# Patient Record
Sex: FEMALE | Race: WHITE
[De-identification: names, ages, dates, MRNs, and addresses within clinical notes are randomized per-mention and may not be internally consistent; named-entity substitution may affect disease eponyms.]

---

## 2017-06-21 ENCOUNTER — HOSPITAL ENCOUNTER (OUTPATIENT)
Dept: HOSPITAL 61 - MAMMO | Age: 32
Discharge: HOME | End: 2017-06-21
Attending: NURSE PRACTITIONER
Payer: COMMERCIAL

## 2017-06-21 DIAGNOSIS — R92.8: Primary | ICD-10-CM

## 2017-06-21 DIAGNOSIS — N64.52: ICD-10-CM

## 2017-06-21 PROCEDURE — 76641 ULTRASOUND BREAST COMPLETE: CPT

## 2017-06-21 NOTE — RAD
DATE: 6/21/2017



EXAM: DIGITAL DIAGNOSTIC BILATERAL, BREAST LEFT



HISTORY: Bilateral yellowish/greenish nipple discharge left greater than right



COMPARISON: None



This study was interpreted with the benefit of Computerized Aided Detection

(CAD).



FINDINGS:



Breast Density: SCATTERED  The breast parenchyma shows scattered

fibroglandular densities. Breast parenchyma level B. 



 Best demonstrated on the cc views are nodular opacities centrally in the

breasts. Several additional images were obtained a dominant mass is not seen

and the nodular opacities on the conventional views likely reflect glandular

elements. The nodular opacity in the left breast seen to persist more so than

the right and targeted ultrasound was performed but no mass was seen on the

targeted ultrasound.







IMPRESSION: Probable benign findings. Follow-up bilateral mammography is

suggested in 6 months to document stability of the nodular opacities seen on

this initial screening exam

                         Consider pituitary or systemic causes for bilateral

nipple discharge.









BI-RADS CATEGORY: 3 PROBABLE BENIGN FINDING(S-SHORT INTERVAL FOLLOW-UP

SUGGESTED



RECOMMENDED FOLLOW-UP: 6M 6 MONTH FOLLOW-UP



PQRS compliance statement: Patient information was entered into a reminder

system with a target due date 12/21/2017 for the next mammogram.



Mammography is a sensitive method for finding small breast cancers, but it

does not detect them all and is not a substitute for careful clinical

examination.  A negative mammogram does not negate a clinically suspicious

finding and should not result in delay in biopsying a clinically suspicious

abnormality.



"Our facility is accredited by the [employer] Mammography

Program."

## 2017-09-27 ENCOUNTER — HOSPITAL ENCOUNTER (OUTPATIENT)
Dept: HOSPITAL 61 - KCIC | Age: 32
Discharge: HOME | End: 2017-09-27
Attending: NURSE PRACTITIONER
Payer: COMMERCIAL

## 2017-09-27 DIAGNOSIS — R53.1: ICD-10-CM

## 2017-09-27 DIAGNOSIS — M25.531: Primary | ICD-10-CM

## 2017-09-27 PROCEDURE — 73110 X-RAY EXAM OF WRIST: CPT

## 2017-09-27 NOTE — [facility]
Three-view right wrist

 

HISTORY: Anterior wrist pain for 4 days. Weakness and swelling.

 

FINDINGS:

No evidence of acute fracture. No bone destruction. Alignment and soft 

tissues appear within normal limits.

 

IMPRESSION: No acute fracture or dislocation.

 

Electronically signed by: Kentrell Hernandez MD (9/27/2017 4:08 PM) [facility]

## 2018-06-22 ENCOUNTER — HOSPITAL ENCOUNTER (OUTPATIENT)
Dept: HOSPITAL 61 - MAMMO | Age: 33
Discharge: HOME | End: 2018-06-22
Attending: NURSE PRACTITIONER
Payer: COMMERCIAL

## 2018-06-22 DIAGNOSIS — R92.8: Primary | ICD-10-CM

## 2018-06-22 PROCEDURE — 77066 DX MAMMO INCL CAD BI: CPT

## 2018-09-06 ENCOUNTER — HOSPITAL ENCOUNTER (OUTPATIENT)
Dept: HOSPITAL 61 - US | Age: 33
Discharge: HOME | End: 2018-09-06
Attending: NURSE PRACTITIONER
Payer: COMMERCIAL

## 2018-09-06 DIAGNOSIS — N88.8: Primary | ICD-10-CM

## 2018-09-06 PROCEDURE — 76830 TRANSVAGINAL US NON-OB: CPT

## 2018-09-06 PROCEDURE — 76856 US EXAM PELVIC COMPLETE: CPT

## 2018-11-07 ENCOUNTER — HOSPITAL ENCOUNTER (OUTPATIENT)
Dept: HOSPITAL 61 - SURGPAT | Age: 33
Discharge: HOME | End: 2018-11-07
Attending: OBSTETRICS & GYNECOLOGY
Payer: COMMERCIAL

## 2018-11-07 DIAGNOSIS — Z88.0: ICD-10-CM

## 2018-11-07 DIAGNOSIS — Z01.818: Primary | ICD-10-CM

## 2018-11-07 LAB
ALBUMIN SERPL-MCNC: 3.5 G/DL (ref 3.4–5)
ALBUMIN/GLOB SERPL: 0.9 {RATIO} (ref 1–1.7)
ALP SERPL-CCNC: 84 U/L (ref 46–116)
ALT SERPL-CCNC: 28 U/L (ref 14–59)
ANION GAP SERPL CALC-SCNC: 13 MMOL/L (ref 6–14)
APTT PPP: YELLOW S
AST SERPL-CCNC: 15 U/L (ref 15–37)
BACTERIA #/AREA URNS HPF: (no result) /HPF
BASOPHILS # BLD AUTO: 0 X10^3/UL (ref 0–0.2)
BASOPHILS NFR BLD: 1 % (ref 0–3)
BILIRUB SERPL-MCNC: 0.2 MG/DL (ref 0.2–1)
BILIRUB UR QL STRIP: NEGATIVE
BUN SERPL-MCNC: 8 MG/DL (ref 7–20)
BUN/CREAT SERPL: 10 (ref 6–20)
CALCIUM SERPL-MCNC: 8.7 MG/DL (ref 8.5–10.1)
CHLORIDE SERPL-SCNC: 104 MMOL/L (ref 98–107)
CO2 SERPL-SCNC: 22 MMOL/L (ref 21–32)
CREAT SERPL-MCNC: 0.8 MG/DL (ref 0.6–1)
EOSINOPHIL NFR BLD: 0.1 X10^3/UL (ref 0–0.7)
EOSINOPHIL NFR BLD: 1 % (ref 0–3)
ERYTHROCYTE [DISTWIDTH] IN BLOOD BY AUTOMATED COUNT: 13.9 % (ref 11.5–14.5)
FIBRINOGEN PPP-MCNC: CLEAR MG/DL
GFR SERPLBLD BASED ON 1.73 SQ M-ARVRAT: 82.6 ML/MIN
GLOBULIN SER-MCNC: 3.8 G/DL (ref 2.2–3.8)
GLUCOSE SERPL-MCNC: 128 MG/DL (ref 70–99)
HCT VFR BLD CALC: 42.3 % (ref 36–47)
HGB BLD-MCNC: 14.4 G/DL (ref 12–15.5)
LYMPHOCYTES # BLD: 2.5 X10^3/UL (ref 1–4.8)
LYMPHOCYTES NFR BLD AUTO: 25 % (ref 24–48)
MCH RBC QN AUTO: 30 PG (ref 25–35)
MCHC RBC AUTO-ENTMCNC: 34 G/DL (ref 31–37)
MCV RBC AUTO: 87 FL (ref 79–100)
MONO #: 0.5 X10^3/UL (ref 0–1.1)
MONOCYTES NFR BLD: 5 % (ref 0–9)
NEUT #: 7 X10^3UL (ref 1.8–7.7)
NEUTROPHILS NFR BLD AUTO: 69 % (ref 31–73)
NITRITE UR QL STRIP: NEGATIVE
PH UR STRIP: 5.5 [PH]
PLATELET # BLD AUTO: 286 X10^3/UL (ref 140–400)
POTASSIUM SERPL-SCNC: 3.6 MMOL/L (ref 3.5–5.1)
PROT SERPL-MCNC: 7.3 G/DL (ref 6.4–8.2)
PROT UR STRIP-MCNC: NEGATIVE MG/DL
RBC # BLD AUTO: 4.85 X10^6/UL (ref 3.5–5.4)
RBC #/AREA URNS HPF: 0 /HPF (ref 0–2)
SODIUM SERPL-SCNC: 139 MMOL/L (ref 136–145)
SQUAMOUS #/AREA URNS LPF: (no result) /LPF
UROBILINOGEN UR-MCNC: 0.2 MG/DL
WBC # BLD AUTO: 10.2 X10^3/UL (ref 4–11)
WBC #/AREA URNS HPF: (no result) /HPF (ref 0–4)

## 2018-11-07 PROCEDURE — 36415 COLL VENOUS BLD VENIPUNCTURE: CPT

## 2018-11-07 PROCEDURE — 81001 URINALYSIS AUTO W/SCOPE: CPT

## 2018-11-07 PROCEDURE — 80053 COMPREHEN METABOLIC PANEL: CPT

## 2018-11-07 PROCEDURE — 85025 COMPLETE CBC W/AUTO DIFF WBC: CPT

## 2018-11-14 ENCOUNTER — HOSPITAL ENCOUNTER (OUTPATIENT)
Dept: HOSPITAL 61 - SURG | Age: 33
Setting detail: OBSERVATION
Discharge: HOME | End: 2018-11-14
Attending: OBSTETRICS & GYNECOLOGY | Admitting: OBSTETRICS & GYNECOLOGY
Payer: COMMERCIAL

## 2018-11-14 VITALS — DIASTOLIC BLOOD PRESSURE: 78 MMHG | SYSTOLIC BLOOD PRESSURE: 112 MMHG

## 2018-11-14 VITALS — DIASTOLIC BLOOD PRESSURE: 63 MMHG | SYSTOLIC BLOOD PRESSURE: 105 MMHG

## 2018-11-14 VITALS — DIASTOLIC BLOOD PRESSURE: 68 MMHG | SYSTOLIC BLOOD PRESSURE: 102 MMHG

## 2018-11-14 VITALS — SYSTOLIC BLOOD PRESSURE: 87 MMHG | DIASTOLIC BLOOD PRESSURE: 59 MMHG

## 2018-11-14 VITALS — WEIGHT: 194 LBS | BODY MASS INDEX: 33.12 KG/M2 | HEIGHT: 64 IN

## 2018-11-14 VITALS — DIASTOLIC BLOOD PRESSURE: 62 MMHG | SYSTOLIC BLOOD PRESSURE: 102 MMHG

## 2018-11-14 VITALS — SYSTOLIC BLOOD PRESSURE: 108 MMHG | DIASTOLIC BLOOD PRESSURE: 70 MMHG

## 2018-11-14 VITALS — SYSTOLIC BLOOD PRESSURE: 110 MMHG | DIASTOLIC BLOOD PRESSURE: 74 MMHG

## 2018-11-14 VITALS — SYSTOLIC BLOOD PRESSURE: 99 MMHG | DIASTOLIC BLOOD PRESSURE: 52 MMHG

## 2018-11-14 DIAGNOSIS — N32.89: ICD-10-CM

## 2018-11-14 DIAGNOSIS — N94.6: ICD-10-CM

## 2018-11-14 DIAGNOSIS — N83.8: ICD-10-CM

## 2018-11-14 DIAGNOSIS — D25.1: Primary | ICD-10-CM

## 2018-11-14 DIAGNOSIS — N92.0: ICD-10-CM

## 2018-11-14 DIAGNOSIS — N80.0: ICD-10-CM

## 2018-11-14 DIAGNOSIS — N88.8: ICD-10-CM

## 2018-11-14 DIAGNOSIS — E28.2: ICD-10-CM

## 2018-11-14 DIAGNOSIS — D25.2: ICD-10-CM

## 2018-11-14 DIAGNOSIS — K66.0: ICD-10-CM

## 2018-11-14 LAB — U PREG PATIENT: NEGATIVE

## 2018-11-14 PROCEDURE — 86850 RBC ANTIBODY SCREEN: CPT

## 2018-11-14 PROCEDURE — 85018 HEMOGLOBIN: CPT

## 2018-11-14 PROCEDURE — 81025 URINE PREGNANCY TEST: CPT

## 2018-11-14 PROCEDURE — 88307 TISSUE EXAM BY PATHOLOGIST: CPT

## 2018-11-14 PROCEDURE — 86900 BLOOD TYPING SEROLOGIC ABO: CPT

## 2018-11-14 PROCEDURE — 58552 LAPARO-VAG HYST INCL T/O: CPT

## 2018-11-14 PROCEDURE — A7015 AEROSOL MASK USED W NEBULIZE: HCPCS

## 2018-11-14 PROCEDURE — 86901 BLOOD TYPING SEROLOGIC RH(D): CPT

## 2018-11-14 PROCEDURE — G0378 HOSPITAL OBSERVATION PER HR: HCPCS

## 2018-11-14 PROCEDURE — 36415 COLL VENOUS BLD VENIPUNCTURE: CPT

## 2018-11-14 PROCEDURE — G0379 DIRECT REFER HOSPITAL OBSERV: HCPCS

## 2018-11-14 RX ADMIN — FENTANYL CITRATE PRN MCG: 50 INJECTION INTRAMUSCULAR; INTRAVENOUS at 12:58

## 2018-11-14 RX ADMIN — FENTANYL CITRATE PRN MCG: 50 INJECTION INTRAMUSCULAR; INTRAVENOUS at 12:45

## 2018-11-14 NOTE — PDOC
BRIEF OPERATIVE NOTE


Date:  Nov 14, 2018


Pre-Op Diagnosis


dysmenorrhea, menorrhagia, uterine fibroids


Post-Op Diagnosis


same plus mild adhesions


Procedure Performed


LAVH/BSO/adhesiolysis


Surgeon


Dr. Lisa Cortez


Assistant


Carla Dunbar,  Assist


Anesthesiologist


Dr. Stewart


Anesthesia Type:  General


Blood Loss


50cc


IV Fluid


1500cc


Urine Output


80cc clear and concentrated via colón


Specimens Obtained


cervix, uterus, bilateral tubes and ovaries


Findings


enlarged fibroid uterus, mildly enlarged PCOS ovaries, some bowel adhesions


Complications


none


Operative Note


[id number]











LISA CORTEZ MD Nov 14, 2018 12:09

## 2018-11-14 NOTE — OP
DATE OF SURGERY:  11/14/2018



PREOPERATIVE DIAGNOSES:  Dysmenorrhea, menorrhagia, uterine fibroids, and

polycystic ovarian syndrome.



POSTOPERATIVE DIAGNOSES:  Dysmenorrhea, menorrhagia, uterine fibroids,

polycystic ovarian syndrome and mild adhesive disease.



PROCEDURE PERFORMED:  Laparoscopic-assisted vaginal hysterectomy, bilateral

salpingo-oophorectomy, and adhesiolysis.



SURGEON:  Alvino Cortez MD



ASSISTANT:  first susanna Pope.



ANESTHESIOLOGIST:  Dr. Aubrey Lancaster.



ANESTHESIA:  General endotracheal.



ESTIMATED BLOOD LOSS:  50 mL.



URINE OUTPUT:  80 mL, clear and concentrated via Ayala catheter.



INTRAVENOUS FLUIDS:  1500 mL of crystalloid.



FINDINGS:  An enlarged fibroid uterus, mildly enlarged PCOS ovaries, some mild

bowel adhesions.



COMPLICATIONS:  None.



SPECIMENS:  Cervix, uterus, bilateral tubes and ovaries.



DESCRIPTION OF PROCEDURE:  This patient was taken to the operating room where

general anesthesia was placed.  The patient was placed in dorsal lithotomy

position in [facility].  The patient's abdomen and vagina were prepped and

draped in the normal sterile fashion and a Ayala catheter had been inserted

under sterile technique.  At this point, a timeout was performed.  Once everyone

agreed, a bivalve speculum was placed in the patient's vagina.  A single-tooth

tenaculum was used to grasp the anterior lip of the cervix.  A 10 mL of 0.25%

Marcaine with epinephrine was used to circumferentially inject around the cervix

for both hemodissection and hemostatic purposes later.  The Valtchev uterine

manipulator was placed through the endocervical os, locked on the single tooth

tenaculum and the bivalve speculum was then removed.  Top gloves were discarded

and changed.



Attention was then turned to the abdomen, where a supraumbilical skin incision

was made with the scalpel.  A curved Elizabeth was used to dissect through the

subcuticular layer to the fascia.  The 5 mm Visiport was used to directly enter

the abdominal cavity.  Opening patient pressure was 4 mmHg.  Carbon dioxide gas

was used to then appropriately insufflate the abdominal cavity to maintain a

pressure of 15 mmHg.  The patient was placed in Trendelenburg position.  Right

and left lower quadrant ports were placed under direct visualization after

transilluminating the abdominal wall, finding an area clear of any vasculature,

making sure there were no adhesions on the other side and placing 5 mm

disposable atraumatic trocars under direct visualization without difficulty.  A

2 mL of air was placed in the cuffs on these.  I then did move the camera to a

lateral port, looked at the umbilical port, it was clear and insufflated the

cuff on it as well with 2 mL of air.  The camera was placed back in the midline.

 The LigaSure Advance and the [place] were put down the accessory ports and the

left tube and ovary were elevated finding the ureter coursing low, crossing high

on the IP ligament under the ovary well above the ureter, cauterizing and

cutting as the patient wished everything out.  Good going down across the

rounds.  This was done exactly the same on the right side, elevating the right

tube and ovary, finding the ureter, it was a little high on the right side, but

still well below where we needed it to be and we could see it clearly staying

high on the IP ligament, right under the ovary crossing it, going through the

ligament and then crossing around as well, staying hugging the uterus, getting

the uterine vessels on the right side, getting the bladder flap started and

going across sharply where I could see and then going back and making sure we

were inside the uterines and going down through the cardinal and broad ligaments

on the right side.  This was done exactly the same on the left, meeting that

bladder flap anteriorly, making sure it was down, getting the uterines and going

down through the cardinal and broad ligaments to the level of the uterosacral. 

The uterus was completely free and blanched.  So at this point, all instruments

were removed from the abdomen and attention was turned vaginally.



The single tooth and Valtchev were removed.  A weighted speculum was placed in

the patient's vagina.  Thyroid Ana clamps were placed on the anterior and

posterior lips of the cervix respectively.  A scalpel was used to make a

circumferential incision in the cervix.  An open Ray-Dayton 4 x 4 was used to

gently push up the anterior bladder peritoneum and the anterior cul-de-sac was

digitally and bluntly entered.  The curved [place] was placed inside here and the

4 x 4 was passed back off and taken out of the patient and passed back off to

the table.  At this point, the cervix was elevated and the posterior cul-de-sac

was sharply entered with Ibrahim scissors.  A #0 Vicryl stitch was used to secure

the posterior peritoneum to the vaginal cuff here and tagged with a curved Elizabeth

clamp and the needle was cut and passed off.  The short weighted speculum was

removed and replaced with the long weighted Kim speculum in the posterior

cul-de-sac.  Curved Jae clamps x 2 were placed on the patient's left

uterosacral ligament.  They were doubly clamped with curved Heaneys, cut with

Ibrahim scissors, suture ligated x 2 and the second one was taken through the

vaginal cuff securing uterosacral ligament to the vaginal cuff and tagging it

with a straight Elizabeth clamp and passing the needle off.  This was done exactly

the same on the right side, double clamping the uterosacrals with curved

Jae's, cutting with Ibrahim scissors, suture ligating x 2 with 0 Vicryl and

taking the second one through the vaginal cuff, tagging it with a straight Elizabeth

clamp and cutting and passing the needle off.  The remaining pedicles on both

sides were delineated with a right angle clamp and the vaginal LigaSure was used

to cauterize the remaining pedicles.  The cervix, uterus, bilateral tubes and

ovaries were delivered in total and passed off for permanent pathology.  The

anterior bladder peritoneum was grasped with a long Allis.  A sponge stick was

used to examine the pedicles.  Once the pedicles appeared hemostatic, the long

weighted speculum was removed and replaced with a short weighted vaginal

speculum.  A 2-0 Vicryl was taken through the anterior bladder peritoneum, left

uterosacral ligament, posterior peritoneum and right uterosacral ligament, thus

closing the peritoneum in a pursestring like fashion.  The right and left

uterosacral tags were clipped at this point.  A full length 2-0 Vicryl was used

to close the vaginal cuff in an anterior to posterior running locked fashion and

tied to the posterior cuff tag and tied and cut.  Once this was done and the

cuff was hemostatic, all instruments were removed.  All gloves were discarded

and changed, and initial counts were correct.



Attention was then turned back above where gas was reinsufflated.  Copious

irrigation revealed hemostasis.  Tisseel was placed over the vaginal cuff and

the pedicles with excellent results.  The right and left lower quadrant ports,

the little cuffs were deflated with 2 mL of air and they were taken out under

direct visualization.  They were hemostatic.  Gas was released from the

umbilical port.  All three port sites were closed with 4-0 nylon at the skin and

injected with local.  The patient is currently being awakened from anesthesia.

 



______________________________

ALVINO CORTEZ MD



DR:  EARNESTINE/steve  JOB#:  [id number] / [id number]

DD:  11/14/2018 12:08  DT:  11/14/2018 12:34

## 2018-11-19 NOTE — PATHOLOGY
[facility] Accession Number: [id number]

.                                                                01

Material submitted:                                        .

CERVIX, UTERUS, BILATERAL TUBES/OVARIES

.                                                                01

Clinical history:                                          .

Polycystic ovary syndrome

.                                                                02

**********************************************************************

Diagnosis:

Uterus and attached bilateral fallopian tubes and ovaries, total

hysterectomy with bilateral salpingo-oophorectomy:

- Leiomyomas, uterine corpus, subserosal and intramural, multiple, the

largest measuring 3.5 cm in greatest dimension (uterine weight 172 grams).

- Chronic cervicitis with focal squamous metaplasia.

- Nabothian cysts, cervix, few.

- Proliferative endometrium.

- Adenomyosis, uterine corpus, sub-basal, focal.

- Congestion of bilateral fallopian tubes.

- Small paratubal cysts, bilateral.

- Cystic follicles of bilateral ovaries, multiple.

LBQ/11/16/2018

**********************************************************************

.                                                                02

Comment:

There is no atypia or evidence of malignancy.

(JPM/db; 11/16/2018)

.                                                                02

Electronically signed:                                     .

Reilly Deshpande MD, Pathologist

NPI- [id number]

.                                                                01

Gross description:                                         .

The specimen is received in formalin, labeled "Bales, Cyndi, cervix,

uterus, bilateral tubes/ovaries" and consists of a 172 g uterus with

attached cervix measuring 11.4 x 6.5 x 6.1 cm.  There is attached

bilateral tubal ovarian complexes consisting of right fallopian tube (5.7

cm in length and 0.5 cm in diameter) attached to a 15 g right ovary (5.3 x

2.7 x 1.5 cm).  The left fallopian tube measures 5.2 cm in length and 0.6

cm in diameter and is attached to a 12 g ovary (4.4 x 2.0 x 1.7 cm).  The

uterine serosa is tan-brown, smooth, and nodular.  The 2.0 cm slitlike

cervical os is surrounded by glistening pink-tan ectocervical mucosa.  The

specimen is bivalved to reveal a tan-pink grooved endocervical canal

measuring 3.8 cm.  The endometrial cavity is triangular measuring 6.0 cm

in length and 2.5 cm in width which is lined by a hemorrhagic endometrium

measuring 0.1 cm.  Further serial sectioning reveals a pink-tan myometrium

measuring up to 3.8 cm containing multiple subserosal and intramural

nodules ranging from 0.5 cm to 3.5 cm.  One nodule has tan and hemorrhagic

cut surfaces while the rest of the nodules have a white-whorled,

homogeneous cut surfaces without hemorrhage necrosis or calcifications.

No additional masses or lesions are identified.

.

Both fimbriated fallopian tubes are purple and smooth with paratubal cysts

ranging from 0.1-0.3 cm.  Both ovaries are tan-brown and multinodular.

Sectioning each fallopian tube reveals a central lumen and no gross

lesions.  Sectioning each ovary reveals multiple uniloculated cysts

containing brown fluid ranging from 0.1-1.0 cm.  Multiple corpora

albicantia and lutea are present.  Representative sections are submitted

as follows:

.

A1: Anterior cervix

A2: Posterior cervix

A3: Anterior endomyometrium

A4: Posterior endomyometrium

A5: Nodules

A6: Nodule with hemorrhage

A7: Right fallopian tube

A8-A9: Right ovary

A10: Left fallopian tube

A11-A12: Left ovary

(SDY; 11/15/2018)

SYU/SYU

.                                                                02

Pathologist provided ICD-10:

D25.1, N72, N80.0, N83.8

.                                                                02

CPT                                                        .

[id number]

Specimen Comment: A courtesy copy of this report has been sent to

Specimen Comment: [phone], [phone].

Specimen Comment: Report sent to  / DR VALDEZ

Specimen Comment: A duplicate report has been generated due to demographic updates.

***Performed at:  01

   [facility]

   [address], [place]  [id number]

   MD Victorino Castañeda MD Phone:  [phone]

***Performed at:  02

   [facility]

   [address] [place], [place]  [id number]

   MD Reilly Deshpande MD Phone:  [phone]